# Patient Record
Sex: FEMALE | Race: AMERICAN INDIAN OR ALASKA NATIVE
[De-identification: names, ages, dates, MRNs, and addresses within clinical notes are randomized per-mention and may not be internally consistent; named-entity substitution may affect disease eponyms.]

---

## 2019-03-12 ENCOUNTER — HOSPITAL ENCOUNTER (EMERGENCY)
Dept: HOSPITAL 5 - ED | Age: 29
Discharge: HOME | End: 2019-03-12
Payer: MEDICAID

## 2019-03-12 VITALS — DIASTOLIC BLOOD PRESSURE: 70 MMHG | SYSTOLIC BLOOD PRESSURE: 116 MMHG

## 2019-03-12 DIAGNOSIS — N39.0: Primary | ICD-10-CM

## 2019-03-12 LAB
ALBUMIN SERPL-MCNC: 4.3 G/DL (ref 3.9–5)
ALT SERPL-CCNC: < 5 UNITS/L (ref 7–56)
BACTERIA #/AREA URNS HPF: (no result) /HPF
BASOPHILS # (AUTO): 0 K/MM3 (ref 0–0.1)
BASOPHILS NFR BLD AUTO: 0.5 % (ref 0–1.8)
BILIRUB DIRECT SERPL-MCNC: < 0.2 MG/DL (ref 0–0.2)
BILIRUB UR QL STRIP: (no result)
BLOOD UR QL VISUAL: (no result)
BUN SERPL-MCNC: 6 MG/DL (ref 7–17)
BUN/CREAT SERPL: 12 %
CALCIUM SERPL-MCNC: 9.1 MG/DL (ref 8.4–10.2)
EOSINOPHIL # BLD AUTO: 0 K/MM3 (ref 0–0.4)
EOSINOPHIL NFR BLD AUTO: 0.2 % (ref 0–4.3)
HCT VFR BLD CALC: 38.5 % (ref 30.3–42.9)
HEMOLYSIS INDEX: 0
HGB BLD-MCNC: 12.3 GM/DL (ref 10.1–14.3)
LYMPHOCYTES # BLD AUTO: 1.6 K/MM3 (ref 1.2–5.4)
LYMPHOCYTES NFR BLD AUTO: 20.7 % (ref 13.4–35)
MCHC RBC AUTO-ENTMCNC: 32 % (ref 30–34)
MCV RBC AUTO: 78 FL (ref 79–97)
MONOCYTES # (AUTO): 1 K/MM3 (ref 0–0.8)
MONOCYTES % (AUTO): 13.2 % (ref 0–7.3)
MUCOUS THREADS #/AREA URNS HPF: (no result) /HPF
PH UR STRIP: 6 [PH] (ref 5–7)
PLATELET # BLD: 292 K/MM3 (ref 140–440)
PROT UR STRIP-MCNC: (no result) MG/DL
RBC # BLD AUTO: 4.93 M/MM3 (ref 3.65–5.03)
RBC #/AREA URNS HPF: 5 /HPF (ref 0–6)
UROBILINOGEN UR-MCNC: 2 MG/DL (ref ?–2)
WBC #/AREA URNS HPF: > 182 /HPF (ref 0–6)

## 2019-03-12 PROCEDURE — 36415 COLL VENOUS BLD VENIPUNCTURE: CPT

## 2019-03-12 PROCEDURE — 80076 HEPATIC FUNCTION PANEL: CPT

## 2019-03-12 PROCEDURE — 80048 BASIC METABOLIC PNL TOTAL CA: CPT

## 2019-03-12 PROCEDURE — 76700 US EXAM ABDOM COMPLETE: CPT

## 2019-03-12 PROCEDURE — 83690 ASSAY OF LIPASE: CPT

## 2019-03-12 PROCEDURE — 96374 THER/PROPH/DIAG INJ IV PUSH: CPT

## 2019-03-12 PROCEDURE — 81001 URINALYSIS AUTO W/SCOPE: CPT

## 2019-03-12 PROCEDURE — 74177 CT ABD & PELVIS W/CONTRAST: CPT

## 2019-03-12 PROCEDURE — 81025 URINE PREGNANCY TEST: CPT

## 2019-03-12 PROCEDURE — 99284 EMERGENCY DEPT VISIT MOD MDM: CPT

## 2019-03-12 PROCEDURE — 85025 COMPLETE CBC W/AUTO DIFF WBC: CPT

## 2019-03-12 NOTE — EMERGENCY DEPARTMENT REPORT
ED Abdominal Pain HPI





- General


Chief Complaint: Abdominal Pain


Stated Complaint: POSS KIDNEY STONES


Time Seen by Provider: 03/12/19 17:01


Source: patient


Mode of arrival: Ambulatory


Limitations: No Limitations





- History of Present Illness


Initial Comments: 


This is a 29-year-old female who presents to the complaint of right-sided flank 

pain for about a week.  Patient states that she's also had about a week of 

nausea.  She admits to dysuria as well.


She denies vaginal discharge, vaginal bleed, fever, vomiting, abdominal pain and

pelvic pain.





MD Complaint: flank pain


Migration to: no migration


Severity scale (0 -10): 9


Quality: aching, sharp


Consistency: intermittent


Improves With: nothing


Worsens With: nothing


Associated Symptoms: nausea, dysuria





- Related Data


                                  Previous Rx's











 Medication  Instructions  Recorded  Last Taken  Type


 


Fluconazole [Diflucan] 150 mg PO ONCE #1 tablet 03/12/19 Unknown Rx


 


Ibuprofen [Motrin] 800 mg PO Q8HR #30 tablet 03/12/19 Unknown Rx


 


Phenazopyridine [Pyridium] 200 mg PO BID #10 tab 03/12/19 Unknown Rx


 


Sulfamethoxazole/Trimethoprim 1 each PO BID #14 tablet 03/12/19 Unknown Rx





[Bactrim DS TAB]    











                                    Allergies











Allergy/AdvReac Type Severity Reaction Status Date / Time


 


No Known Allergies Allergy   Unverified 03/12/19 16:17














ED Review of Systems


ROS: 


Stated complaint: POSS KIDNEY STONES


Other details as noted in HPI





Comment: All other systems reviewed and negative





ED Past Medical Hx





- Past Medical History


Previous Medical History?: No





- Surgical History


Past Surgical History?: No





- Social History


Smoking Status: Never Smoker


Substance Use Type: None





- Medications


Home Medications: 


                                Home Medications











 Medication  Instructions  Recorded  Confirmed  Last Taken  Type


 


Fluconazole [Diflucan] 150 mg PO ONCE #1 tablet 03/12/19  Unknown Rx


 


Ibuprofen [Motrin] 800 mg PO Q8HR #30 tablet 03/12/19  Unknown Rx


 


Phenazopyridine [Pyridium] 200 mg PO BID #10 tab 03/12/19  Unknown Rx


 


Sulfamethoxazole/Trimethoprim 1 each PO BID #14 tablet 03/12/19  Unknown Rx





[Bactrim DS TAB]     














ED Physical Exam





- General


Limitations: No Limitations


General appearance: alert, in no apparent distress





- Head


Head exam: Present: atraumatic, normocephalic





- Eye


Eye exam: Present: normal appearance





- ENT


ENT exam: Present: mucous membranes moist





- Neck


Neck exam: Present: normal inspection





- Respiratory


Respiratory exam: Present: normal lung sounds bilaterally.  Absent: respiratory 

distress





- Cardiovascular


Cardiovascular Exam: Present: regular rate, normal rhythm.  Absent: systolic 

murmur, diastolic murmur, rubs, gallop





- GI/Abdominal


GI/Abdominal exam: Present: soft, normal bowel sounds.  Absent: distended, 

tenderness, guarding, rebound, organomegaly





- Extremities Exam


Extremities exam: Present: normal inspection, full ROM





- Back Exam


Back exam: Present: normal inspection





- Neurological Exam


Neurological exam: Present: alert, oriented X3





- Psychiatric


Psychiatric exam: Present: normal affect, normal mood





- Skin


Skin exam: Present: warm, dry, intact, normal color.  Absent: rash





ED Course


                                   Vital Signs











  03/12/19 03/12/19 03/12/19





  16:27 20:14 20:34


 


Temperature 98.1 F  


 


Pulse Rate 97 H 69 


 


Respiratory 16 16 15





Rate   


 


Blood Pressure  116/70 


 


Blood Pressure 119/72  





[Right]   


 


O2 Sat by Pulse 98 100 





Oximetry   














  03/12/19





  23:45


 


Temperature 


 


Pulse Rate 86


 


Respiratory 15





Rate 


 


Blood Pressure 


 


Blood Pressure 





[Right] 


 


O2 Sat by Pulse 100





Oximetry 














ED Medical Decision Making





- Lab Data


Result diagrams: 


                                 03/12/19 17:26





                                 03/12/19 17:26





- Radiology Data


Radiology results: report reviewed, image reviewed





CT scan shows abnormal finding on the liver sole radiologist recommended 

ultrasound.


Abdominal ultrasound shows no acute findings.





- Medical Decision Making





29-year-old female presents with flank pain


Patient received pain medication in ED.  She reports feeling much better.


CT scan of the abdomen shows abnormal finding the 6 cm mass on the liver.


Patient had no abdominal pain or tenderness.


Ultrasound was ordered ultrasound did not show this finding seen on the CT scan.


I discussed this findings with the patient


I discussed the patient to follow up with the primary care physician needed MRI 

for further evaluation.


Patient was treated with antibiotics treated for UTI


Vital signs are normal she is in no acute distress


Critical care attestation.: 


If time is entered above; I have spent that time in minutes in the direct care 

of this critically ill patient, excluding procedure time.








ED Disposition


Clinical Impression: 


 Flank pain, UTI (urinary tract infection)





Disposition: DC-01 TO HOME OR SELFCARE


Is pt being admited?: No


Does the pt Need Aspirin: No


Condition: Stable


Instructions:  Urinary Tract Infection in Women (ED), Acute Pyelonephritis (ED),

 Abdominal Pain (ED)


Additional Instructions: 


Make sure to follow up with the primary care physician as discussed.


Take all your medications as you've been prescribed.


If you have any worsening symptoms or develop new symptoms please return to ED 

immediately.


Prescriptions: 


Sulfamethoxazole/Trimethoprim [Bactrim DS TAB] 1 each PO BID #14 tablet


Fluconazole [Diflucan] 150 mg PO ONCE #1 tablet


Ibuprofen [Motrin] 800 mg PO Q8HR #30 tablet


Phenazopyridine [Pyridium] 200 mg PO BID #10 tab


Referrals: 


CE AIKEN MD [Primary Care Provider] - 3-5 Days


[facility] [Provider Group] - 3-5 Days


[facility] [Provider Group] - 3-5 Days


[facility] [Outside] - 3-5 Days


[facility] [Outside] - 3-5 Days


Forms:  Work/School Release Form(ED)


Time of Disposition: 23:38

## 2019-03-12 NOTE — CAT SCAN REPORT
PROCEDURE: CT ABDOMEN PELVIS W CON 

 

TECHNIQUE:  Computerized axial tomography of the abdomen and pelvis was performed after the IV inject
ion of iodinated nonionic contrast.   

 

CT DOSE LENGTH PRODUCT: mGycm 

 

HISTORY: Abdominal Pain 

 

COMPARISONS:  None . 

 

FINDINGS: 

 

Arterial phase images demonstrate a mildly hypervascular lesion in left lobe liver measuring about 6.
6 x 5.2 cm. This lesion is hypodense in the delayed phase images. There is no central scar. The splee
n, pancreas and adrenal glands are within normal limits. Bilateral kidneys demonstrate uniform enhanc
ement without hydronephrosis. Urinary bladder is partially filled with normal outlines. Aorta is of n
ormal caliber. Minimal degree of free fluid is noted in the pelvic cavity which is within physiologic
 limits. There is no free air. Uterus is retroverted. A 4.7 x 3.2 cm cystic lesion is noted in the ri
ght adnexal region. Gallbladder is unremarkable. Small bowel loops are within normal limits. There is
 mild degree residual stool. Appendix is normal. Vertebral height is normal. 

 

IMPRESSION: 

A 6.6 x 5.2 cm lesion is noted in the left lobe liver. MRI pre and post contrast is recommended to ru
le out a neoplasm. 

A 4.7 x 3.2 cm cystic lesion in the right adnexal region most likely represents an ovarian cyst. Ultr
asound evaluation may be recommended. 

Uterus is retroverted 

 

This document is electronically signed by Wilfrid Pena MD., March 12 2019 08:40:49 PM ET

## 2019-03-12 NOTE — EMERGENCY DEPARTMENT REPORT
Blank Doc





- Documentation


Documentation: 





RLQ pain with nausea. no dysuria or hematuria.

## 2019-03-12 NOTE — ULTRASOUND REPORT
PROCEDURE: US ABDOMEN COMPLETE 

 

TECHNIQUE:  Standard ultrasound of the abdomen  

 

HISTORY: liver mass on CT 

 

COMPARISONS: CT A/P 3/12/2019 . 

 

FINDINGS: 

 

Examination of the gallbladder demonstrates no evidence for gallstones, distention, wall thickening, 
or pericholecystic fluid. No sonographic Nash's sign is elicited. Common bile duct is normal in cecil
meter measuring 3.3 mm. 

 

The liver is normal and homogeneous in echogenicity without intrahepatic biliary dilatation. Hyperech
oic focus in the left lobe liver measuring 2.4 x 0.9 x 1.4 cm. Corresponds to the hypodense focus jose
ng the falciform ligament seen on CT. Findings suggest focal fatty deposition. 

 

The large rounded density seen in the left lobe of the liver on CT is not visualized by ultrasound. C
ontinued evaluation with MRI is recommended. 

 

The pancreas is normal in thickness without focal abnormality or pancreatic duct dilatation. The sple
en is normal in length measuring 9.8 cm and homogeneous in echogenicity without focal abnormalities. 


 

Examination of the kidneys demonstrates both to be normal in size and have normal cortical echogenici
ty and thickness. The right and left kidneys measure 12.9 cm and 10.6 cm in craniocaudal length, resp
ectively. No evidence for calculi, hydronephrosis, or solid mass is seen in either kidney. 

 

The inferior vena cava and aorta are normal. Maximum diameter of the aorta is 1.7 cm in its proximal 
portion. 

 

IMPRESSION: 

 

1. Large rounded density seen on CT in the left lobe is not identified by ultrasound. Continued evalu
ation with MRI is recommended 

2. Hyperechoic smaller focus along the falciform ligament anteriorly is likely focal fatty deposition
, as also noted on CT 

 

This document is electronically signed by Katie Rice MD., March 12 2019 10:42:24 PM ET

## 2019-04-08 ENCOUNTER — HOSPITAL ENCOUNTER (OUTPATIENT)
Dept: HOSPITAL 5 - MRI | Age: 29
Discharge: HOME | End: 2019-04-08
Attending: INTERNAL MEDICINE
Payer: MEDICAID

## 2019-04-08 DIAGNOSIS — R16.0: Primary | ICD-10-CM

## 2019-04-08 PROCEDURE — A9577 INJ MULTIHANCE: HCPCS

## 2019-04-08 PROCEDURE — 74183 MRI ABD W/O CNTR FLWD CNTR: CPT

## 2019-04-08 NOTE — MAGNETIC RESONANCE REPORT
PROCEDURE: MR ABDOMEN WO/W CON 

 

HISTORY: WITH CONTRAST THEN WITHOUT CONTRAST OF LIVER, LIVER MASS 

 

FINDINGS: MRI of the abdomen was performed using axial T1-weighted gradient echo images in and out of
 phase, axial T2, coronal T2*gradient echo, coronal T1, axial fat saturated T1-weighted gradient echo
, and axial and coronal postcontrast T1-weighted gradient echo images obtained following the intraven
ous administration of 16 cc MultiHance. Comparison is made to the CT examination of March 12, 2019, a
t which time a lesion was noted in the liver. 

 

On MRI there is a lesion of the medial segment left lobe of liver which is hypervascular in the arter
ial phase, arterial phase image 61, measuring 5.6 x 6.5 cm. This lesion is isointense of the liver on
 precontrast T1 and T2-weighted images and becomes isointense to the liver on the delayed postcontras
t phase. This is consistent with a lesion of hepatocellular origin and, given its isointensity to radha
er on T1 weighted images,T2 weighted images and postcontrast delayed images, likely represents focal 
nodular hyperplasia. On delayed phase imaging, this lesion likely has a small central scar, axial del
ayed phase image 57, again consistent with focal nodular hyperplasia. 

 

The spleen, adrenal glands, pancreas, gallbladder, kidneys are unremarkable. The abdominal aorta is n
ormal in size. The celiac axis, superior mesenteric artery, inferior mesenteric artery and bilateral 
renal arteries appear patent. 

 

IMPRESSION: Lesion of medial segment left lobe of liver, hypervascular in the arterial phase but othe
rwise isointense to remainder of liver. This has MR imaging characteristics most consistent with foca
l nodular hyperplasia 

 

This document is electronically signed by Andres Perez MD., April 8 2019 07:43:19 PM ET

## 2019-10-18 ENCOUNTER — HOSPITAL ENCOUNTER (EMERGENCY)
Dept: HOSPITAL 5 - ED | Age: 29
LOS: 1 days | Discharge: HOME | End: 2019-10-19
Payer: MEDICAID
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
## 2019-10-18 DIAGNOSIS — S70.312A: Primary | ICD-10-CM
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
## 2019-10-18 PROCEDURE — 99281 EMR DPT VST MAYX REQ PHY/QHP: CPT

## 2019-10-19 VITALS — DIASTOLIC BLOOD PRESSURE: 80 MMHG | SYSTOLIC BLOOD PRESSURE: 130 MMHG
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
## 2019-12-17 ENCOUNTER — HOSPITAL ENCOUNTER (EMERGENCY)
Dept: HOSPITAL 5 - ED | Age: 29
Discharge: HOME | End: 2019-12-17
Payer: MEDICAID

## 2019-12-17 VITALS — SYSTOLIC BLOOD PRESSURE: 142 MMHG | DIASTOLIC BLOOD PRESSURE: 87 MMHG

## 2019-12-17 DIAGNOSIS — Z79.899: ICD-10-CM

## 2019-12-17 DIAGNOSIS — Z98.890: ICD-10-CM

## 2019-12-17 DIAGNOSIS — F17.200: ICD-10-CM

## 2019-12-17 DIAGNOSIS — T78.40XA: Primary | ICD-10-CM

## 2019-12-17 LAB
ALBUMIN SERPL-MCNC: 3.9 G/DL (ref 3.9–5)
ALT SERPL-CCNC: 10 UNITS/L (ref 7–56)
BASOPHILS # (AUTO): 0.1 K/MM3 (ref 0–0.1)
BASOPHILS NFR BLD AUTO: 1.2 % (ref 0–1.8)
BUN SERPL-MCNC: 13 MG/DL (ref 7–17)
BUN/CREAT SERPL: 22 %
CALCIUM SERPL-MCNC: 8.6 MG/DL (ref 8.4–10.2)
EOSINOPHIL # BLD AUTO: 0.1 K/MM3 (ref 0–0.4)
EOSINOPHIL NFR BLD AUTO: 1.6 % (ref 0–4.3)
HCT VFR BLD CALC: 35 % (ref 30.3–42.9)
HEMOLYSIS INDEX: 10
HGB BLD-MCNC: 11.3 GM/DL (ref 10.1–14.3)
LYMPHOCYTES # BLD AUTO: 1.5 K/MM3 (ref 1.2–5.4)
LYMPHOCYTES NFR BLD AUTO: 36.2 % (ref 13.4–35)
MCHC RBC AUTO-ENTMCNC: 32 % (ref 30–34)
MCV RBC AUTO: 79 FL (ref 79–97)
MONOCYTES # (AUTO): 0.5 K/MM3 (ref 0–0.8)
MONOCYTES % (AUTO): 11.1 % (ref 0–7.3)
PLATELET # BLD: 266 K/MM3 (ref 140–440)
RBC # BLD AUTO: 4.41 M/MM3 (ref 3.65–5.03)

## 2019-12-17 PROCEDURE — 36415 COLL VENOUS BLD VENIPUNCTURE: CPT

## 2019-12-17 PROCEDURE — 80053 COMPREHEN METABOLIC PANEL: CPT

## 2019-12-17 PROCEDURE — 99284 EMERGENCY DEPT VISIT MOD MDM: CPT

## 2019-12-17 PROCEDURE — 84703 CHORIONIC GONADOTROPIN ASSAY: CPT

## 2019-12-17 PROCEDURE — 71045 X-RAY EXAM CHEST 1 VIEW: CPT

## 2019-12-17 PROCEDURE — 96375 TX/PRO/DX INJ NEW DRUG ADDON: CPT

## 2019-12-17 PROCEDURE — 96374 THER/PROPH/DIAG INJ IV PUSH: CPT

## 2019-12-17 PROCEDURE — 85025 COMPLETE CBC W/AUTO DIFF WBC: CPT

## 2019-12-17 NOTE — EMERGENCY DEPARTMENT REPORT
HPI





- General


Chief Complaint: Allergic Reaction


Time Seen by Provider: 12/17/19 13:19





- HPI


HPI: 





30 YO FEMALE WHO COMES TO ER WITH LIP SWELLING, UPPER LEFT LIP, AFTER TAKING 

[employer] POWDER. SHE STATES SHE HAS NEVER TAKEN IT BEFORE. 





PMH


NONE





PSH


NONE





LMP 11/29





RX DAILY


NONE





SHE TOOK THE GOODYS FOR A TOOTHACHE SHE WAS HAVING. NO ABSCESS/LUDWIGS. SHE HAS 

BEEN SEEING DMD. 





[facility]


[facility] INTACT


SAT99 ON ROOM AIR


TAKING PO





NO HX OF THE SAME 





NO ALLERGIES





ED Past Medical Hx





- Past Medical History


Previous Medical History?: No





- Surgical History


Past Surgical History?: Yes


Additional Surgical History: Ovary removed





- Family History


Family history: no significant





- Social History


Smoking Status: Current Every Day Smoker


Substance Use Type: Alcohol





- Medications


Home Medications: 


                                Home Medications











 Medication  Instructions  Recorded  Confirmed  Last Taken  Type


 


Famotidine [Pepcid] 20 mg PO BID #60 tablet 12/17/19  Unknown Rx


 


predniSONE [Deltasone] 20 mg PO DAILY #5 tablet 12/17/19  Unknown Rx














ED Review of Systems


ROS: 


Stated complaint: ALLERGIC REACTION


Other details as noted in HPI





Comment: All other systems reviewed and negative





Physical Exam





- Physical Exam


Vital Signs: 


                                   Vital Signs











  12/17/19





  13:17


 


Respiratory 17





Rate 














ED Course


                                   Vital Signs











  12/17/19





  13:17


 


Respiratory 17





Rate 














ED Medical Decision Making





- Lab Data


Result diagrams: 


                                 12/17/19 14:12





                                 12/17/19 14:12





- Radiology Data


Radiology results: report reviewed, image reviewed





- Medical Decision Making





Labs











  12/17/19 12/17/19 12/17/19





  14:12 14:12 14:12


 


WBC  4.3 L  


 


RBC  4.41  


 


Hgb  11.3  


 


Hct  35.0  


 


MCV  79  


 


MCH  26 L  


 


MCHC  32  


 


RDW  16.1 H  


 


Plt Count  266  


 


Lymph % (Auto)  36.2 H  


 


Mono % (Auto)  11.1 H  


 


Eos % (Auto)  1.6  


 


Baso % (Auto)  1.2  


 


Lymph #  1.5  


 


Mono #  0.5  


 


Eos #  0.1  


 


Baso #  0.1  


 


Seg Neutrophils %  49.9  


 


Seg Neutrophils #  2.1  


 


Sodium   140 


 


Potassium   3.6 


 


Chloride   105.9 


 


Carbon Dioxide   19 L 


 


Anion Gap   19 


 


BUN   13 


 


Creatinine   0.6 L 


 


Estimated GFR   > 60 


 


BUN/Creatinine Ratio   22 


 


Glucose   86 


 


Calcium   8.6 


 


Total Bilirubin   0.40 


 


AST   15 


 


ALT   10 


 


Alkaline Phosphatase   60 


 


Total Protein   6.8 


 


Albumin   3.9 


 


Albumin/Globulin Ratio   1.3 


 


HCG, Qual    Negative








                                   Vital Signs











  12/17/19 12/17/19 12/17/19





  13:17 14:32 14:40


 


Temperature  98.5 F 


 


Pulse Rate  57 L 61


 


Respiratory 17 26 H 22





Rate   


 


Blood Pressure   123/75


 


Blood Pressure  123/75 





[Right]   


 


O2 Sat by Pulse  99 99





Oximetry   














  12/17/19 12/17/19 12/17/19





  15:00 15:20 15:40


 


Temperature   


 


Pulse Rate 57 L 61 60


 


Respiratory 14 16 16





Rate   


 


Blood Pressure 141/84 142/92 144/84


 


Blood Pressure   





[Right]   


 


O2 Sat by Pulse 98 98 100





Oximetry   








LABS NOTED





MEDICATED IN ER 





MONITORED FOR 4 HOURS 





[facility]TA[facility]


[facility]


TAKING PO





NO POST PHARNYX/UVULA SWELLING


LUNGS CTA


AMBULATORY


NON TOXIC


NO SOB


NO CP


NON ILL APPEARING 





DC HOME WITH DC PLAN OF CARE AND PCP AND DERM FOLLOW UP











- Differential Diagnosis


ALLERGIC RX


Critical care attestation.: 


If time is entered above; I have spent that time in minutes in the direct care 

of this critically ill patient, excluding procedure time.








ED Disposition


Clinical Impression: 


 Allergic reaction





Disposition: DC-01 TO HOME OR SELFCARE


Is pt being admited?: No


Does the pt Need Aspirin: No


Condition: Stable


Instructions:  Allergies (ED)


Additional Instructions: 


AVOID ALL ASPIRIN CONTAINING PRODUCTS





FOLLOW UP WITH PCP 


FOLLOW UP WITH ALLERGIST





REFERRALS BELOW





MEDS AS ORDERED TODAY





Prescriptions: 


predniSONE [Deltasone] 20 mg PO DAILY #5 tablet


Famotidine [Pepcid] 20 mg PO BID #60 tablet


Referrals: 


MIGUEL DO MD [Staff Physician] - 3-5 Days


ABRIL SERRANO MD [Referring] - 3-5 Days


RYAN ANDERSON MD [Referring] - 3-5 Days


Time of Disposition: 16:15





Physical Exam





- Physical Exam


Vital Signs: 


                                   Vital Signs











  12/17/19 12/17/19 12/17/19





  13:17 14:32 14:40


 


Temperature  98.5 F 


 


Pulse Rate  57 L 61


 


Respiratory 17 26 H 22





Rate   


 


Blood Pressure   123/75


 


Blood Pressure  123/75 





[Right]   


 


O2 Sat by Pulse  99 99





Oximetry   














  12/17/19 12/17/19 12/17/19





  15:00 15:20 15:40


 


Temperature   


 


Pulse Rate 57 L 61 60


 


Respiratory 14 16 16





Rate   


 


Blood Pressure 141/84 142/92 144/84


 


Blood Pressure   





[Right]   


 


O2 Sat by Pulse 98 98 100





Oximetry   














ED Physical Exam





- General


Limitations: No Limitations


General appearance: alert, in no apparent distress





- Head


Head exam: Present: other (SWELLING UPPER LIP - LEFT SIDE)





- Eye


Eye exam: Present: normal appearance





- ENT


ENT exam: Present: mucous membranes moist





- Neck


Neck exam: Present: normal inspection





- Respiratory


Respiratory exam: Present: normal lung sounds bilaterally.  Absent: respiratory 

distress





- Cardiovascular


Cardiovascular Exam: Present: regular rate, normal rhythm.  Absent: systolic 

murmur, diastolic murmur, rubs, gallop





- GI/Abdominal


GI/Abdominal exam: Present: soft, normal bowel sounds





- Extremities Exam


Extremities exam: Present: normal inspection





- Back Exam


Back exam: Present: normal inspection





- Neurological Exam


Neurological exam: Present: alert, oriented X3





- Psychiatric


Psychiatric exam: Present: normal affect, normal mood





- Skin


Skin exam: Present: warm, dry, intact, normal color.  Absent: rash

## 2019-12-17 NOTE — XRAY REPORT
CHEST 1 VIEW



INDICATION: 

sob



COMPARISON: 

None



FINDINGS:



Support devices: None 



Heart: Normal 



Lungs/Pleura: No acute pulmonary or pleural findings.  





IMPRESSION:

1. No acute disease.



Signer Name: Suleman Gonzalez MD 

Signed: 12/17/2019 1:53 PM

 Workstation Name: [id number]

## 2021-07-26 ENCOUNTER — HOSPITAL ENCOUNTER (EMERGENCY)
Dept: HOSPITAL 5 - ED | Age: 31
Discharge: LEFT BEFORE DISCHARGE | End: 2021-07-26
Payer: MEDICAID

## 2021-07-26 VITALS — DIASTOLIC BLOOD PRESSURE: 71 MMHG | SYSTOLIC BLOOD PRESSURE: 128 MMHG
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
## 2021-07-26 DIAGNOSIS — Z53.21: ICD-10-CM
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
## 2021-07-26 DIAGNOSIS — T14.8XXA: Primary | ICD-10-CM
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
## 2021-09-15 ENCOUNTER — HOSPITAL ENCOUNTER (EMERGENCY)
Dept: HOSPITAL 5 - ED | Age: 31
Discharge: HOME | End: 2021-09-15
Payer: MEDICAID

## 2021-09-15 VITALS — SYSTOLIC BLOOD PRESSURE: 123 MMHG | DIASTOLIC BLOOD PRESSURE: 84 MMHG

## 2021-09-15 DIAGNOSIS — F10.20: ICD-10-CM

## 2021-09-15 DIAGNOSIS — F17.200: ICD-10-CM

## 2021-09-15 DIAGNOSIS — J00: Primary | ICD-10-CM

## 2021-09-15 PROCEDURE — 99282 EMERGENCY DEPT VISIT SF MDM: CPT

## 2021-09-15 NOTE — EMERGENCY DEPARTMENT REPORT
ED General Adult HPI





- General


Chief complaint: Upper Respiratory Infection


Stated complaint: SINUS INFECTION


Time Seen by Provider: 09/15/21 21:18


Source: patient


Mode of arrival: Ambulatory


Limitations: No Limitations





- History of Present Illness


Initial comments: 


30 y/o female pt presents to ED w/ complaints of itchy watery eyes, nasal 

congestion, and sneezing for 4 days.  No known sick contacts.  No current 

steroid or antibiotic use.  No recent travel.  Patient has been using 

over-the-counter Mucinex and cough/cold remedies with limited relief.  Denies 

fever, chills, cough, chest pain, shortness of breath, wheezing, neck stiffness,

headache, vomiting.  Denies all other complaints at this time.





- Related Data


                                  Previous Rx's











 Medication  Instructions  Recorded  Last Taken  Type


 


Famotidine [Pepcid] 20 mg PO BID #60 tablet 12/17/19 Unknown Rx


 


predniSONE [Deltasone] 20 mg PO DAILY #5 tablet 12/17/19 Unknown Rx


 


Cetirizine HCl 10 mg PO DAILY #7 tablet 09/15/21 Unknown Rx


 


Fluticasone [Flonase] 1 spray NS QDAY #1 bottle 09/15/21 Unknown Rx











                                    Allergies











Allergy/AdvReac Type Severity Reaction Status Date / Time


 


No Known Allergies Allergy   Unverified 03/12/19 16:17














ED Review of Systems


ROS: 


Stated complaint: SINUS INFECTION


Other details as noted in HPI





Other: 





GENERAL: Negative for fever, chills, weight change, anorexia, fatigue.


EYES: Positive for itching/watering. 


ENT: Positive for congestion and sneezing.


CARDIOVASCULAR: Negative for chest pain, palpitations, lower extremity swelling.

 


PULMONARY: Negative for cough, dyspnea, wheezing, orthopnea, cyanosis. 


GASTROINTESTINAL: Negative for abdominal pain, nausea, vomiting, diarrhea, 

constipation. 


MUSCULOSKELETAL: Negative for joint pain, joint swelling, myalgias, back pain, 

neck pain. 


NEUROLOGICAL: Negative for headache, seizure, syncope, paresthesias, weakness. 


INTEGUMENTARY: Negative for erythema, rash, diaphoresis, laceration, ecchymosis.

 


HEMATOLOGICAL: Negative for hemoptysis, hematemesis, hematochezia, hematuria.


PSYCHIATRIC: Negative for hallucinations, suicidal ideation, homicidal ideation,

 anxiety, depression.





ED Past Medical Hx





- Past Medical History


Previous Medical History?: No





- Surgical History


Past Surgical History?: Yes


Additional Surgical History: Ovary removed





- Social History


Smoking Status: Current Every Day Smoker


Substance Use Type: Alcohol





- Medications


Home Medications: 


                                Home Medications











 Medication  Instructions  Recorded  Confirmed  Last Taken  Type


 


Famotidine [Pepcid] 20 mg PO BID #60 tablet 12/17/19  Unknown Rx


 


predniSONE [Deltasone] 20 mg PO DAILY #5 tablet 12/17/19  Unknown Rx


 


Cetirizine HCl 10 mg PO DAILY #7 tablet 09/15/21  Unknown Rx


 


Fluticasone [Flonase] 1 spray NS QDAY #1 bottle 09/15/21  Unknown Rx














ED Physical Exam





- General


Limitations: No Limitations





- Other


Other exam information: 





General: Awake and alert. No acute distress. 


Head: Atraumatic, normocephalic.


Eyes: EOMI. Pupils are equal and round. Normal sclera and conjunctiva. 


ENT: Clear rhinorrhea.  Oral mucosa is moist. Normal pharyngeal exam.


Neck: Supple. No lymphadenopathy.


Pulmonary: No respiratory distress. Clear to auscultation bilaterally. 


Cardiac: Regular rate and rhythm. Pulses are palpable and equal bilaterally. No 

lower extremity cyanosis or edema. 


Skin: Warm and dry. No rashes. 


Abdomen: Soft, non-tender, non-protuberant. No guarding, rigidity, or rebound. 

Bowel sounds are normal. No organomegaly or masses noted. 


Back: Normal alignment. No CVA tenderness.


Extremities: Symmetrical. Full range of motion intact. 


Neurological: Alert and oriented, appropriately interactive, no focal deficits.


Psych: Cooperative. Appropriate mood and affect. Speech is evenly metered. 

Thoughts are logically construed.





ED Course





                                   Vital Signs











  09/15/21 09/15/21 09/15/21





  20:37 20:39 21:04


 


Temperature  98 F 


 


Pulse Rate 90  83


 


Respiratory 16  18





Rate   


 


Blood Pressure 133/84  


 


Blood Pressure   123/84





[Right]   


 


O2 Sat by Pulse 100  





Oximetry   














ED Medical Decision Making





- Medical Decision Making





Patient presents to the emergency department with signs/symptoms consistent with

 acute nasopharyngitis.  She is afebrile, hemodynamically stable, tolerating 

oral intake without difficulty, no distress.  No clinical evidence to suggest 

airway obstruction or systemic bacterial illness warranting further diagnostic 

work-up on an emergent basis at this time.  Patient will be discharged home with

 appropriate symptomatic treatment and referred to primary care provider for 

close outpatient follow-up.  Patient expressed understanding and is agreeable to

 plan of care.  Disease transmission precautions discussed.  Strict return 

precautions provided.





History, exam, diagnostic testing, and current condition do not suggest 

worrisome pathology to warrant further testing, continued ED treatment, 

admission, or surgical evaluation at this point. Given the low probability of a 

significant medical illness, it would be more likely to result in harm than 

benefit to perform further testing at this stage. Discussed findings, 

presumptive diagnosis, need for follow-up and specific signs/symptoms that 

should prompt immediate return to the emergency department. Instructions were 

explained in detail to the patient in addition to giving written discharge 

information. Patient expressed understanding and was given the opportunity to 

ask questions, all of which were satisfactorily answered prior to discharge 

home.


Critical care attestation.: 


If time is entered above; I have spent that time in minutes in the direct care 

of this critically ill patient, excluding procedure time.








ED Disposition


Clinical Impression: 


 Acute nasopharyngitis





Disposition: 01 HOME / SELF CARE / HOMELESS


Is pt being admited?: No


Does the pt Need Aspirin: No


Condition: Stable


Instructions:  Nonallergic Rhinitis


Additional Instructions: 


Take Tylenol every 4 hours and Motrin every 8 hours as needed for pain.


Take Cetirizine as directed.


Use Flonase as directed.


Exposure to warm humidified air may help relieve congestion.


Rest.  Drink plenty of fluids.


Wash hands frequently to prevent disease transmission.


Do not share food or drinks with others.


Follow-up with primary care provider this week.  Call tomorrow to schedule an 

appointment.


Return to the emergency department immediately for new or worsening symptoms.


Prescriptions: 


Cetirizine HCl 10 mg PO DAILY #7 tablet


Fluticasone [Flonase] 1 spray NS QDAY #1 bottle


Referrals: 


MIGUEL DO MD [Staff Physician] - 3-5 Days


Time of Disposition: 22:01